# Patient Record
Sex: MALE | Race: WHITE | NOT HISPANIC OR LATINO | Employment: FULL TIME | URBAN - METROPOLITAN AREA
[De-identification: names, ages, dates, MRNs, and addresses within clinical notes are randomized per-mention and may not be internally consistent; named-entity substitution may affect disease eponyms.]

---

## 2023-11-01 ENCOUNTER — TELEPHONE (OUTPATIENT)
Dept: PRIMARY CARE | Facility: CLINIC | Age: 63
End: 2023-11-01
Payer: COMMERCIAL

## 2023-11-01 DIAGNOSIS — I10 BENIGN ESSENTIAL HYPERTENSION: Primary | ICD-10-CM

## 2023-11-01 RX ORDER — AMLODIPINE BESYLATE 5 MG/1
5 TABLET ORAL DAILY
Qty: 90 TABLET | Refills: 0 | Status: SHIPPED | OUTPATIENT
Start: 2023-11-01 | End: 2024-01-18 | Stop reason: SDUPTHER

## 2023-11-01 RX ORDER — AMLODIPINE BESYLATE 5 MG/1
5 TABLET ORAL DAILY
COMMUNITY
End: 2023-11-01 | Stop reason: SDUPTHER

## 2023-11-01 NOTE — TELEPHONE ENCOUNTER
Rx Refill Request Telephone Encounter    Name:  Yeison Case  :  [id number]  Medication Name:  amlodipine   Dose : 5 mg   Route :    Frequency : 1 a day   Quantity : 30  Directions :    Specific Pharmacy location:  [facility] [place]   Date of last appointment:  22  Date of next appointment:    Best number to reach patient:  [phone]

## 2023-12-11 ENCOUNTER — APPOINTMENT (OUTPATIENT)
Dept: PRIMARY CARE | Facility: CLINIC | Age: 63
End: 2023-12-11
Payer: COMMERCIAL

## 2024-01-04 ENCOUNTER — APPOINTMENT (OUTPATIENT)
Dept: PRIMARY CARE | Facility: CLINIC | Age: 64
End: 2024-01-04
Payer: COMMERCIAL

## 2024-01-18 ENCOUNTER — LAB (OUTPATIENT)
Dept: LAB | Facility: LAB | Age: 64
End: 2024-01-18
Payer: COMMERCIAL

## 2024-01-18 ENCOUNTER — OFFICE VISIT (OUTPATIENT)
Dept: PRIMARY CARE | Facility: CLINIC | Age: 64
End: 2024-01-18
Payer: COMMERCIAL

## 2024-01-18 VITALS
SYSTOLIC BLOOD PRESSURE: 128 MMHG | BODY MASS INDEX: 48.55 KG/M2 | OXYGEN SATURATION: 97 % | HEIGHT: 67 IN | TEMPERATURE: 97.2 F | DIASTOLIC BLOOD PRESSURE: 85 MMHG | HEART RATE: 79 BPM | WEIGHT: 309.3 LBS

## 2024-01-18 DIAGNOSIS — Z82.49 FAMILY HISTORY OF ABDOMINAL AORTIC ANEURYSM (AAA): ICD-10-CM

## 2024-01-18 DIAGNOSIS — Z12.5 PROSTATE CANCER SCREENING: ICD-10-CM

## 2024-01-18 DIAGNOSIS — Z87.891 EX-SMOKER: ICD-10-CM

## 2024-01-18 DIAGNOSIS — Z00.00 ANNUAL PHYSICAL EXAM: ICD-10-CM

## 2024-01-18 DIAGNOSIS — Z00.00 ANNUAL PHYSICAL EXAM: Primary | ICD-10-CM

## 2024-01-18 DIAGNOSIS — I10 BENIGN ESSENTIAL HYPERTENSION: ICD-10-CM

## 2024-01-18 PROBLEM — K64.9 HEMORRHOIDS: Status: ACTIVE | Noted: 2024-01-18

## 2024-01-18 PROBLEM — N13.30 BILATERAL HYDRONEPHROSIS: Status: ACTIVE | Noted: 2024-01-18

## 2024-01-18 PROBLEM — N43.3 HYDROCELE OF TESTIS: Status: ACTIVE | Noted: 2024-01-18

## 2024-01-18 PROBLEM — E55.9 VITAMIN D DEFICIENCY: Status: ACTIVE | Noted: 2024-01-18

## 2024-01-18 PROBLEM — J30.9 ALLERGIC RHINITIS: Status: ACTIVE | Noted: 2024-01-18

## 2024-01-18 PROBLEM — E66.01 MORBID OBESITY (MULTI): Status: ACTIVE | Noted: 2024-01-18

## 2024-01-18 PROBLEM — M10.9 GOUT: Status: ACTIVE | Noted: 2024-01-18

## 2024-01-18 PROBLEM — M48.02 CERVICAL STENOSIS OF SPINE: Status: ACTIVE | Noted: 2024-01-18

## 2024-01-18 PROBLEM — M47.812 CERVICAL SPONDYLOSIS: Status: ACTIVE | Noted: 2024-01-18

## 2024-01-18 PROBLEM — M19.90 ARTHRITIS: Status: ACTIVE | Noted: 2024-01-18

## 2024-01-18 LAB
ALBUMIN SERPL BCP-MCNC: 4 G/DL (ref 3.4–5)
ALP SERPL-CCNC: 62 U/L (ref 33–136)
ALT SERPL W P-5'-P-CCNC: 19 U/L (ref 10–52)
ANION GAP SERPL CALC-SCNC: 13 MMOL/L (ref 10–20)
AST SERPL W P-5'-P-CCNC: 14 U/L (ref 9–39)
BASOPHILS # BLD AUTO: 0.06 X10*3/UL (ref 0–0.1)
BASOPHILS NFR BLD AUTO: 0.8 %
BILIRUB SERPL-MCNC: 0.7 MG/DL (ref 0–1.2)
BUN SERPL-MCNC: 15 MG/DL (ref 6–23)
CALCIUM SERPL-MCNC: 8.9 MG/DL (ref 8.6–10.6)
CHLORIDE SERPL-SCNC: 107 MMOL/L (ref 98–107)
CHOLEST SERPL-MCNC: 176 MG/DL (ref 0–199)
CHOLESTEROL/HDL RATIO: 3.9
CO2 SERPL-SCNC: 26 MMOL/L (ref 21–32)
CREAT SERPL-MCNC: 0.95 MG/DL (ref 0.5–1.3)
EGFRCR SERPLBLD CKD-EPI 2021: >90 ML/MIN/1.73M*2
EOSINOPHIL # BLD AUTO: 0.13 X10*3/UL (ref 0–0.7)
EOSINOPHIL NFR BLD AUTO: 1.7 %
ERYTHROCYTE [DISTWIDTH] IN BLOOD BY AUTOMATED COUNT: 13 % (ref 11.5–14.5)
GLUCOSE SERPL-MCNC: 102 MG/DL (ref 74–99)
HCT VFR BLD AUTO: 46.8 % (ref 41–52)
HDLC SERPL-MCNC: 44.8 MG/DL
HGB BLD-MCNC: 15.1 G/DL (ref 13.5–17.5)
IMM GRANULOCYTES # BLD AUTO: 0.04 X10*3/UL (ref 0–0.7)
IMM GRANULOCYTES NFR BLD AUTO: 0.5 % (ref 0–0.9)
LDLC SERPL CALC-MCNC: 114 MG/DL
LYMPHOCYTES # BLD AUTO: 1.39 X10*3/UL (ref 1.2–4.8)
LYMPHOCYTES NFR BLD AUTO: 18 %
MCH RBC QN AUTO: 27.8 PG (ref 26–34)
MCHC RBC AUTO-ENTMCNC: 32.3 G/DL (ref 32–36)
MCV RBC AUTO: 86 FL (ref 80–100)
MONOCYTES # BLD AUTO: 0.54 X10*3/UL (ref 0.1–1)
MONOCYTES NFR BLD AUTO: 7 %
NEUTROPHILS # BLD AUTO: 5.58 X10*3/UL (ref 1.2–7.7)
NEUTROPHILS NFR BLD AUTO: 72 %
NON HDL CHOLESTEROL: 131 MG/DL (ref 0–149)
NRBC BLD-RTO: 0 /100 WBCS (ref 0–0)
PLATELET # BLD AUTO: 295 X10*3/UL (ref 150–450)
POTASSIUM SERPL-SCNC: 4.7 MMOL/L (ref 3.5–5.3)
PROT SERPL-MCNC: 6.6 G/DL (ref 6.4–8.2)
PSA SERPL-MCNC: 2.46 NG/ML
RBC # BLD AUTO: 5.43 X10*6/UL (ref 4.5–5.9)
SODIUM SERPL-SCNC: 141 MMOL/L (ref 136–145)
TRIGL SERPL-MCNC: 86 MG/DL (ref 0–149)
VLDL: 17 MG/DL (ref 0–40)
WBC # BLD AUTO: 7.7 X10*3/UL (ref 4.4–11.3)

## 2024-01-18 PROCEDURE — 1036F TOBACCO NON-USER: CPT | Performed by: INTERNAL MEDICINE

## 2024-01-18 PROCEDURE — 3079F DIAST BP 80-89 MM HG: CPT | Performed by: INTERNAL MEDICINE

## 2024-01-18 PROCEDURE — 85025 COMPLETE CBC W/AUTO DIFF WBC: CPT

## 2024-01-18 PROCEDURE — 80053 COMPREHEN METABOLIC PANEL: CPT

## 2024-01-18 PROCEDURE — 84153 ASSAY OF PSA TOTAL: CPT

## 2024-01-18 PROCEDURE — 36415 COLL VENOUS BLD VENIPUNCTURE: CPT

## 2024-01-18 PROCEDURE — 99396 PREV VISIT EST AGE 40-64: CPT | Performed by: INTERNAL MEDICINE

## 2024-01-18 PROCEDURE — 3074F SYST BP LT 130 MM HG: CPT | Performed by: INTERNAL MEDICINE

## 2024-01-18 PROCEDURE — 80061 LIPID PANEL: CPT

## 2024-01-18 RX ORDER — AMLODIPINE BESYLATE 5 MG/1
5 TABLET ORAL DAILY
Qty: 90 TABLET | Refills: 3 | Status: SHIPPED | OUTPATIENT
Start: 2024-01-18 | End: 2025-01-17

## 2024-01-18 ASSESSMENT — PATIENT HEALTH QUESTIONNAIRE - PHQ9
2. FEELING DOWN, DEPRESSED OR HOPELESS: NOT AT ALL
SUM OF ALL RESPONSES TO PHQ9 QUESTIONS 1 AND 2: 0
1. LITTLE INTEREST OR PLEASURE IN DOING THINGS: NOT AT ALL

## 2024-01-18 NOTE — PROGRESS NOTES
Subjective   Patient ID: Yeison Case is a 63 y.o. male who presents for Annual Exam (Pts right wrist has been hurting him. ).    HPI   The last health maintenance visit was 2 years  ago.   Has right wrist pain  The patient's health since the last visit is described as good.   PMH, PSH and current medications are noted below. Social and family history is noted below. he has regular dental visits. he complains of vision problems. Vision care includes wearing glasses and an eye examination more than a year ago. he denies hearing loss. Immunizations status: up to date.   Lifestyle: he consumes a diverse and healthy diet. he has weight concerns. he does not exercise regularly. he does not use tobacco. Tobacco Use Duration: 26 pack year(s) of cigarette use. he consumes alcohol. He reports occasional alcohol use.   Reproductive health: the patient is sexually active. He is monogamous with a female partner.   Colorectal Cancer Screening: rpt 2027.colonoscopy was performed 2017.   Prostate Cancer Screening:. patient is at high risk for prostate cancer.   Metabolic screening: lipid profile performed  and glucose screening performed .      Review of Systems  General: No significant change in weight, no fatigue, no fever, chills, or night sweats.  HEENT: No headache, dizziness, syncope. No blurred vision, double vision. No hearing loss, or ringing. No nasal discharge, sinus problems. No loose teeth, sore throat, hoarseness or neck stiffness.   Respiratory: No cough, mucous in throat, hemoptysis, wheezing, or shortness of breath. No snoring.  Cardiac: No chest pain, palpitations, orthopnea. No leg swelling or claudication pain.   Gastrointestinal: No indigestion, heartburn, nausea, vomiting, diarrhea, constipation, rectal bleeding or hemorrhoids.  Genitourinary: No UTI symptoms, stones, incontinence.No nocturia or hesitancy  Endocrine: No excess thirst or urination.  Hematopoietic: No anemia, easy bruising or bleeding. No  "history of blood transfusion.  Neuro: No localized weakness, numbness or tingling. No tremor, history of seizure. No history of memory problems.  Psychological: No anxiety, depression or sleep disturbance.   Objective   /85   Pulse 79   Temp 36.2 °C (97.2 °F)   Ht 1.702 m (5' 7\")   Wt 140 kg (309 lb 4.8 oz)   SpO2 97%   BMI 48.44 kg/m²     Physical Exam  Constitutional: General appearance: Abnormal.  obese.   Head and Face: Head and face: Normal.  Palpation of the face and sinuses: Normal.    Eyes: Normal external exam. Pupils were equal in size, round, reactive to light (PERRL) with normal accommodation and extraocular movements intact (EOMI).   Neck: No neck mass was observed. Supple. Thyroid not enlarged and there were no palpable thyroid nodules.   Cardiovascular: Heart rate and rhythm were normal, normal S1 and S2, no gallops, no murmurs and no pericardial rub. Pedal pulses: Normal. No peripheral edema.   Pulmonary: No respiratory distress. Clear bilateral breath sounds.   Chest: Breasts: Normal appearance, no nipple discharge. Chest: Normal.    Abdomen: Soft nontender; no abdominal mass palpated. No organomegaly. No hernias. Anus, perineum, and rectum: Abnormal.  ext hemorrhoid.   Genitourinary: sees uro.has hydrocele.  Musculoskeletal: Gait and station: Normal. No joint swelling seen, normal movements of all extremities. Range of motion: Normal.  Muscle strength/tone: Normal.  Has slight tenderness left right wrist extensor aspect.  Likely small ganglion cyst  Skin: Normal skin color and pigmentation, normal skin turgor, and no rash.   Neurologic: Cranial nerves 2-12 grossly intact. Coordination: Normal.    Psychiatric: Judgment and insight: Intact. Mood and affect: Normal.   Lymphatic: No cervical lymphadenopathy.   Assessment/Plan   Problem List Items Addressed This Visit             ICD-10-CM    Benign essential hypertension I10    Relevant Medications    amLODIPine (Norvasc) 5 mg tablet    " Other Relevant Orders    Vascular US abdominal aorta anuerysm AAA screening    Annual physical exam - Primary Z00.00    Relevant Orders    CBC and Auto Differential    Comprehensive Metabolic Panel    Lipid Panel     Other Visit Diagnoses         Codes    Prostate cancer screening     Z12.5    Relevant Orders    Prostate Specific Antigen    Family history of abdominal aortic aneurysm (AAA)     Z82.49    Relevant Orders    Vascular US abdominal aorta anuerysm AAA screening    Ex-smoker     Z87.891    Relevant Orders    Vascular US abdominal aorta anuerysm AAA screening             Preventive exam and counseling completed. Screening lab work ordered,. Risks and benefits of PSA discussed and he has high-risk family history  Weight reduction discussed. He is working on it.colonoscopynl 2017  See urology as needed for hydrocele.  Get covid vaccine.       Wrist pain likely from ganglion.  Wear wrist brace and also apply pressure.  See Ortho specialist if needed    pt has snoring,no apneas or daytime sleepiness  Will order CT scoring after labs

## 2024-01-22 ENCOUNTER — TELEPHONE (OUTPATIENT)
Dept: PRIMARY CARE | Facility: CLINIC | Age: 64
End: 2024-01-22
Payer: COMMERCIAL

## 2024-01-22 NOTE — TELEPHONE ENCOUNTER
----- Message from Malgorzata Contreras MD sent at 1/19/2024  4:18 PM EST -----  Advice all labs are normal.

## 2024-03-15 ENCOUNTER — LAB REQUISITION (OUTPATIENT)
Dept: LAB | Facility: HOSPITAL | Age: 64
End: 2024-03-15
Payer: COMMERCIAL

## 2024-03-15 DIAGNOSIS — R30.0 DYSURIA: ICD-10-CM

## 2024-03-15 DIAGNOSIS — N39.0 URINARY TRACT INFECTION, SITE NOT SPECIFIED: ICD-10-CM

## 2024-03-15 PROCEDURE — 87086 URINE CULTURE/COLONY COUNT: CPT

## 2024-03-15 PROCEDURE — 87186 SC STD MICRODIL/AGAR DIL: CPT

## 2024-03-18 LAB — BACTERIA UR CULT: ABNORMAL

## 2024-03-18 NOTE — RESULT ENCOUNTER NOTE
Got a urine culture result copied  to me.  Just making sure that you are on an antibiotic.  Please let me know  I would like to know the name of the antibiotic

## 2024-04-03 ENCOUNTER — OFFICE VISIT (OUTPATIENT)
Dept: PRIMARY CARE | Facility: CLINIC | Age: 64
End: 2024-04-03
Payer: COMMERCIAL

## 2024-04-03 VITALS
HEIGHT: 67 IN | HEART RATE: 77 BPM | OXYGEN SATURATION: 97 % | SYSTOLIC BLOOD PRESSURE: 129 MMHG | BODY MASS INDEX: 46.66 KG/M2 | DIASTOLIC BLOOD PRESSURE: 84 MMHG | TEMPERATURE: 96 F | WEIGHT: 297.3 LBS

## 2024-04-03 DIAGNOSIS — N30.01 ACUTE CYSTITIS WITH HEMATURIA: Primary | ICD-10-CM

## 2024-04-03 LAB
POC APPEARANCE, URINE: CLEAR
POC BILIRUBIN, URINE: NEGATIVE
POC BLOOD, URINE: ABNORMAL
POC COLOR, URINE: YELLOW
POC GLUCOSE, URINE: NEGATIVE MG/DL
POC KETONES, URINE: NEGATIVE MG/DL
POC LEUKOCYTES, URINE: ABNORMAL
POC NITRITE,URINE: NEGATIVE
POC PH, URINE: 6 PH
POC PROTEIN, URINE: ABNORMAL MG/DL
POC SPECIFIC GRAVITY, URINE: 1.01
POC UROBILINOGEN, URINE: 0.2 EU/DL

## 2024-04-03 PROCEDURE — 3079F DIAST BP 80-89 MM HG: CPT | Performed by: INTERNAL MEDICINE

## 2024-04-03 PROCEDURE — 1036F TOBACCO NON-USER: CPT | Performed by: INTERNAL MEDICINE

## 2024-04-03 PROCEDURE — 81002 URINALYSIS NONAUTO W/O SCOPE: CPT | Performed by: INTERNAL MEDICINE

## 2024-04-03 PROCEDURE — 3074F SYST BP LT 130 MM HG: CPT | Performed by: INTERNAL MEDICINE

## 2024-04-03 PROCEDURE — 87086 URINE CULTURE/COLONY COUNT: CPT

## 2024-04-03 PROCEDURE — 99213 OFFICE O/P EST LOW 20 MIN: CPT | Performed by: INTERNAL MEDICINE

## 2024-04-03 PROCEDURE — 87186 SC STD MICRODIL/AGAR DIL: CPT

## 2024-04-03 RX ORDER — CIPROFLOXACIN 500 MG/1
500 TABLET ORAL 2 TIMES DAILY
Qty: 14 TABLET | Refills: 0 | Status: SHIPPED | OUTPATIENT
Start: 2024-04-03 | End: 2024-04-10

## 2024-04-03 ASSESSMENT — PATIENT HEALTH QUESTIONNAIRE - PHQ9
SUM OF ALL RESPONSES TO PHQ9 QUESTIONS 1 AND 2: 0
1. LITTLE INTEREST OR PLEASURE IN DOING THINGS: NOT AT ALL
2. FEELING DOWN, DEPRESSED OR HOPELESS: NOT AT ALL

## 2024-04-06 LAB — BACTERIA UR CULT: ABNORMAL

## 2024-04-09 ENCOUNTER — TELEPHONE (OUTPATIENT)
Dept: PRIMARY CARE | Facility: CLINIC | Age: 64
End: 2024-04-09
Payer: COMMERCIAL

## 2024-04-09 NOTE — TELEPHONE ENCOUNTER
----- Message from Malgorzata Contreras MD sent at 4/8/2024  8:44 AM EDT -----  Advice to complete cipro course based on culture results.

## 2024-04-25 ENCOUNTER — OFFICE VISIT (OUTPATIENT)
Dept: UROLOGY | Facility: HOSPITAL | Age: 64
End: 2024-04-25
Payer: COMMERCIAL

## 2024-04-25 DIAGNOSIS — N30.00 ACUTE CYSTITIS WITHOUT HEMATURIA: Primary | ICD-10-CM

## 2024-04-25 DIAGNOSIS — N40.1 BENIGN LOCALIZED PROSTATIC HYPERPLASIA WITH LOWER URINARY TRACT SYMPTOMS (LUTS): ICD-10-CM

## 2024-04-25 PROCEDURE — 99214 OFFICE O/P EST MOD 30 MIN: CPT | Performed by: UROLOGY

## 2024-04-25 PROCEDURE — 51798 US URINE CAPACITY MEASURE: CPT | Performed by: UROLOGY

## 2024-04-25 PROCEDURE — 1036F TOBACCO NON-USER: CPT | Performed by: UROLOGY

## 2024-04-25 ASSESSMENT — PAIN SCALES - GENERAL: PAINLEVEL: 0-NO PAIN

## 2024-04-25 NOTE — PROGRESS NOTES
FUV    Last seen - 10/15/21     HISTORY OF PRESENT ILLNESS:   Yeison Case is a 64 y.o. male who is being seen today for fuv.  Has not been seen since 2021. Treated for hydrocele in the past, had left hydrocelectomy.      Recently presented to urgent care with UTI. Pan-sensitive UTI, first treated in march, had another UTI few weeks later.      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    Cervical disc displacement    Other chest pain 03/03/2021    Atypical chest pain    Other specified cough 02/01/2021    Post-viral cough syndrome    Personal history of other diseases of the musculoskeletal system and connective tissue 08/07/2014    History of neck pain    Personal history of other specified conditions 05/06/2020    History of epistaxis       PAST SURGICAL HISTORY:  Past Surgical History:   Procedure Laterality Date    OTHER SURGICAL HISTORY  10/24/2017    Hemorrhoidectomy Doppler-Guided Artery Ligation    RHINOPLASTY  08/07/2014    Rhinoplasty        ALLERGIES:   Allergies   Allergen Reactions    Dog Dander Other    House Dust Other    Sulfa (Sulfonamide Antibiotics) Rash and Other        MEDICATIONS:   Current Outpatient Medications   Medication Instructions    amLODIPine (NORVASC) 5 mg, oral, Daily, as directed        PHYSICAL EXAM:  There were no vitals taken for this visit.  Constitutional: Patient appears well-developed and well-nourished. No distress.    Pulmonary/Chest: Effort normal. No respiratory distress.   Musculoskeletal: Normal range of motion.    Neurological: Alert and oriented to person, place, and time.  Psychiatric: Normal mood and affect. Behavior is normal.  Thought content normal.      Labs  Lab Results   Component Value Date    PSA 2.46 01/18/2024     Lab Results   Component Value Date    GFRMALE >90 06/11/2022     Lab Results   Component Value Date    CREATININE 0.95 01/18/2024     Lab Results   Component Value Date    CHOL 176 01/18/2024     Lab Results   Component Value Date    HDL 44.8 01/18/2024     Lab Results   Component Value Date    CHHDL 3.9 01/18/2024     Lab Results   Component Value Date    LDLF 111 (H) 06/11/2022     Lab Results   Component Value Date    VLDL 17 01/18/2024     Lab Results   Component Value Date    TRIG 86 01/18/2024     Lab Results   Component Value Date    HGBA1C 5.1 06/11/2022     Lab Results   Component Value Date    HCT 46.8 01/18/2024       PVR 3      Assessment:      1. Acute cystitis without hematuria        2. Benign localized prostatic hyperplasia with lower urinary tract symptoms (LUTS)          Yeison Case is a 64 y.o. male here for FUV     Plan:   1)  Will get ct abd/pel to assess for nidus of infection  2) Conservative voiding measures    Lower Urinary Tract Symptoms (LUTS)    I educated the patient on relevant lower urinary tract anatomy and physiology with emphasis on differential diagnosis as well as contributing factors to the patient's lower urinary tract symptoms. I educated the patient on dietary and behavioral modifications pertinent to the patient's complaints. I recommended to avoid caffeine, alcohol, spicy and acidic oral intake and to regulate fluid intake and voiding (timed voiding, double voiding). I stressed the importance of avoiding constipation and recommended stool softeners unless diarrhea present. We discussed limiting fluid intake at night and elevating legs prior to bedtime.     The mechanism of action as well as the risks, benefits, common side effects, and alternatives to all prescribed medications were discussed with the patient at length. The patient had the opportunity to ask questions and all  questions were answered. I primarily discussed alpha blockers, 5ARIs, PDE5i, anticholinergics, and beta 3 agonist (mirabegron).  I explained that alpha blockers help decrease bladder outlet resistance with potential side effects to include retrograde ejaculation, rhinitis, and light headedness. I explained that 5 alpha reductase inhibitors can shrink the prostate up to 30%, can artificially decrease their PSA value by 50%, but take approximately 6-9 months to reach full efficacy and have potential side effects to include decreased libido, impotence and breast tenderness.

## 2024-05-02 ENCOUNTER — HOSPITAL ENCOUNTER (OUTPATIENT)
Dept: RADIOLOGY | Facility: CLINIC | Age: 64
Discharge: HOME | End: 2024-05-02
Payer: COMMERCIAL

## 2024-05-02 DIAGNOSIS — N30.00 ACUTE CYSTITIS WITHOUT HEMATURIA: ICD-10-CM

## 2024-05-02 PROCEDURE — 74176 CT ABD & PELVIS W/O CONTRAST: CPT

## 2024-05-02 PROCEDURE — 74176 CT ABD & PELVIS W/O CONTRAST: CPT | Performed by: STUDENT IN AN ORGANIZED HEALTH CARE EDUCATION/TRAINING PROGRAM

## 2024-05-09 ENCOUNTER — OFFICE VISIT (OUTPATIENT)
Dept: UROLOGY | Facility: HOSPITAL | Age: 64
End: 2024-05-09
Payer: COMMERCIAL

## 2024-05-09 DIAGNOSIS — N40.1 BENIGN LOCALIZED PROSTATIC HYPERPLASIA WITH LOWER URINARY TRACT SYMPTOMS (LUTS): Primary | ICD-10-CM

## 2024-05-09 PROCEDURE — 99214 OFFICE O/P EST MOD 30 MIN: CPT | Performed by: UROLOGY

## 2024-05-09 NOTE — PROGRESS NOTES
FUV    Last seen - 4/25/24     HISTORY OF PRESENT ILLNESS:   Yeison Case is a 64 y.o. male who is being seen today for fuv.  Has not been seen since 2021. Treated for hydrocele in the past, had left hydrocelectomy.      Recently presented to urgent care with UTI. Pan-sensitive UTI, first treated in march, had another UTI few weeks later.      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    Cervical disc displacement    Other chest pain 03/03/2021    Atypical chest pain    Other specified cough 02/01/2021    Post-viral cough syndrome    Personal history of other diseases of the musculoskeletal system and connective tissue 08/07/2014    History of neck pain    Personal history of other specified conditions 05/06/2020    History of epistaxis     PAST SURGICAL HISTORY:  Past Surgical History:   Procedure Laterality Date    OTHER SURGICAL HISTORY  10/24/2017    Hemorrhoidectomy Doppler-Guided Artery Ligation    RHINOPLASTY  08/07/2014    Rhinoplasty     ALLERGIES:   Allergies   Allergen Reactions    Dog Dander Other    House Dust Other    Sulfa (Sulfonamide Antibiotics) Rash and Other     MEDICATIONS:   Current Outpatient Medications   Medication Instructions    amLODIPine (NORVASC) 5 mg, oral, Daily, as directed      PHYSICAL EXAM:  There were no vitals taken for this visit.  Constitutional: Patient appears well-developed and well-nourished. No distress.    Pulmonary/Chest: Effort normal. No respiratory distress.   Musculoskeletal: Normal range of motion.    Neurological: Alert and oriented to person, place, and  time.  Psychiatric: Normal mood and affect. Behavior is normal. Thought content normal.      Labs  Lab Results   Component Value Date    PSA 2.46 01/18/2024     Lab Results   Component Value Date    GFRMALE >90 06/11/2022     Lab Results   Component Value Date    CREATININE 0.95 01/18/2024     Lab Results   Component Value Date    CHOL 176 01/18/2024     Lab Results   Component Value Date    HDL 44.8 01/18/2024     Lab Results   Component Value Date    CHHDL 3.9 01/18/2024     Lab Results   Component Value Date    LDLF 111 (H) 06/11/2022     Lab Results   Component Value Date    VLDL 17 01/18/2024     Lab Results   Component Value Date    TRIG 86 01/18/2024     Lab Results   Component Value Date    HGBA1C 5.1 06/11/2022     Lab Results   Component Value Date    HCT 46.8 01/18/2024         Assessment:      1. Benign localized prostatic hyperplasia with lower urinary tract symptoms (LUTS)          Yeison Case is a 64 y.o. male here for FUV    Doing well  CT negative for nidus  Plan:     Lower Urinary Tract Symptoms (LUTS)    I educated the patient on relevant lower urinary tract anatomy and physiology with emphasis on differential diagnosis as well as contributing factors to the patient's lower urinary tract symptoms. I educated the patient on dietary and behavioral modifications pertinent to the patient's complaints. I recommended to avoid caffeine, alcohol, spicy and acidic oral intake and to regulate fluid intake and voiding (timed voiding, double voiding). I stressed the importance of avoiding constipation and recommended stool softeners unless diarrhea present. We discussed limiting fluid intake at night and elevating legs prior to bedtime.     The mechanism of action as well as the risks, benefits, common side effects, and alternatives to all prescribed medications were discussed with the patient at length. The patient had the opportunity to ask questions and all questions were answered. I primarily discussed  alpha blockers, 5ARIs, PDE5i, anticholinergics, and beta 3 agonist (mirabegron).  I explained that alpha blockers help decrease bladder outlet resistance with potential side effects to include retrograde ejaculation, rhinitis, and light headedness. I explained that 5 alpha reductase inhibitors can shrink the prostate up to 30%, can artificially decrease their PSA value by 50%, but take approximately 6-9 months to reach full efficacy and have potential side effects to include decreased libido, impotence and breast tenderness.

## 2024-07-22 ENCOUNTER — OFFICE VISIT (OUTPATIENT)
Dept: UROLOGY | Facility: HOSPITAL | Age: 64
End: 2024-07-22
Payer: COMMERCIAL

## 2024-07-22 DIAGNOSIS — N50.812 PAIN IN BOTH TESTICLES: ICD-10-CM

## 2024-07-22 DIAGNOSIS — N30.00 ACUTE CYSTITIS WITHOUT HEMATURIA: ICD-10-CM

## 2024-07-22 DIAGNOSIS — N40.1 BENIGN LOCALIZED PROSTATIC HYPERPLASIA WITH LOWER URINARY TRACT SYMPTOMS (LUTS): Primary | ICD-10-CM

## 2024-07-22 DIAGNOSIS — N50.811 PAIN IN BOTH TESTICLES: ICD-10-CM

## 2024-07-22 DIAGNOSIS — N43.3 HYDROCELE OF TESTIS: ICD-10-CM

## 2024-07-22 LAB
POC APPEARANCE, URINE: CLEAR
POC BILIRUBIN, URINE: NEGATIVE
POC BLOOD, URINE: ABNORMAL
POC COLOR, URINE: YELLOW
POC GLUCOSE, URINE: NEGATIVE MG/DL
POC KETONES, URINE: NEGATIVE MG/DL
POC LEUKOCYTES, URINE: NEGATIVE
POC NITRITE,URINE: NEGATIVE
POC PH, URINE: 6.5 PH
POC PROTEIN, URINE: NEGATIVE MG/DL
POC SPECIFIC GRAVITY, URINE: 1.01
POC UROBILINOGEN, URINE: 0.2 EU/DL

## 2024-07-22 PROCEDURE — 51798 US URINE CAPACITY MEASURE: CPT | Performed by: UROLOGY

## 2024-07-22 PROCEDURE — 99214 OFFICE O/P EST MOD 30 MIN: CPT | Mod: 25 | Performed by: UROLOGY

## 2024-07-22 PROCEDURE — 99214 OFFICE O/P EST MOD 30 MIN: CPT | Performed by: UROLOGY

## 2024-07-22 PROCEDURE — 81003 URINALYSIS AUTO W/O SCOPE: CPT | Performed by: UROLOGY

## 2024-07-22 NOTE — PROGRESS NOTES
FUV    Last seen - 5/9/24     HISTORY OF PRESENT ILLNESS:   Yeison Case is a 64 y.o. male who is being seen today for fuv.  Has not been seen since 2021. Treated for hydrocele in the past, had left hydrocelectomy.      Recently presented to urgent care with UTI. Pan-sensitive UTI, first treated in march, had another UTI few weeks later.      5/9/24 - Been urinating ok, no dysuria, no urgency or frequency    CT abd/pel - no stones, no hydro, left simple cyst, small prostate    7/22/24 - Hydrocele becoming more bothersome. 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    Cervical disc displacement    Other chest pain 03/03/2021    Atypical chest pain    Other specified cough 02/01/2021    Post-viral cough syndrome    Personal history of other diseases of the musculoskeletal system and connective tissue 08/07/2014    History of neck pain    Personal history of other specified conditions 05/06/2020    History of epistaxis     PAST SURGICAL HISTORY:  Past Surgical History:   Procedure Laterality Date    OTHER SURGICAL HISTORY  10/24/2017    Hemorrhoidectomy Doppler-Guided Artery Ligation    RHINOPLASTY  08/07/2014    Rhinoplasty     ALLERGIES:   Allergies   Allergen Reactions    Dog Dander Other    House Dust Other    Sulfa (Sulfonamide Antibiotics) Rash and Other     MEDICATIONS:   Current Outpatient Medications   Medication Instructions    amLODIPine (NORVASC) 5 mg, oral, Daily, as directed      PHYSICAL EXAM:  There were no vitals taken for this visit.  Constitutional: Patient appears well-developed and well-nourished. No distress.    Pulmonary/Chest: Effort normal. No respiratory distress.   Musculoskeletal: Normal range of  motion.    Neurological: Alert and oriented to person, place, and time.  Psychiatric: Normal mood and affect. Behavior is normal. Thought content normal.      Labs  Lab Results   Component Value Date    PSA 2.46 01/18/2024     Lab Results   Component Value Date    GFRMALE >90 06/11/2022     Lab Results   Component Value Date    CREATININE 0.95 01/18/2024     Lab Results   Component Value Date    CHOL 176 01/18/2024     Lab Results   Component Value Date    HDL 44.8 01/18/2024     Lab Results   Component Value Date    CHHDL 3.9 01/18/2024     Lab Results   Component Value Date    LDLF 111 (H) 06/11/2022     Lab Results   Component Value Date    VLDL 17 01/18/2024     Lab Results   Component Value Date    TRIG 86 01/18/2024     Lab Results   Component Value Date    HGBA1C 5.1 06/11/2022     Lab Results   Component Value Date    HCT 46.8 01/18/2024     CT abd/pel (5/2/24)   ACCESSION NUMBER(S):  [id number]      ORDERING CLINICIAN:  SUNIL GRAJEDA      TECHNIQUE:  CT of the abdomen and pelvis was performed. Contiguous axial images  were obtained at 3 mm slice thickness through the abdomen and pelvis.  Coronal and sagittal reconstructions at 3 mm slice thickness were  performed.  No intravenous contrast was administered.      FINDINGS:  Please note that the evaluation of vessels, lymph nodes and organs is  limited without intravenous contrast.      LOWER CHEST:  Patchy and linear opacities in the lingula and left lower lobe,  compatible with atelectasis.      ABDOMEN:      LIVER:  Hepatic steatosis multiple low-attenuation lesions, compatible with  simple cysts.      BILE DUCTS:  The intrahepatic and extrahepatic ducts are not dilated.      GALLBLADDER:  The gallbladder contains multiple radiopaque stones.      PANCREAS:  The pancreas appears unremarkable without evidence of ductal  dilatation or masses.      SPLEEN:  The spleen is normal in size without focal lesions.      ADRENAL GLANDS:  Bilateral adrenal glands appear  normal.      KIDNEYS AND URETERS:  The kidneys are normal in size and unremarkable in appearance.  Moderate left pelviectasis. 3.3 cm left upper pole simple cyst and  subcentimeter right interpolar region simple cysts.. No renal calculi  or hydronephrosis.      PELVIS:      BLADDER:  The urinary bladder appears normal without abnormal wall thickening.      REPRODUCTIVE ORGANS:  No pelvic masses.      BOWEL:  The stomach is unremarkable.  The small and large bowel are normal in  caliber and demonstrate no wall thickening.  The appendix appears  normal.      VESSELS:  Mild atherosclerotic changes are noted to the aorta and branching  vessels. There is no aneurysmal dilatation.      PERITONEUM/RETROPERITONEUM/LYMPH NODES:  No ascites or free air, no fluid collection.  No abdominopelvic  lymphadenopathy is present.      ABDOMINAL WALL:  The abdominal wall soft tissues appear normal.      BONES:  No suspicious osseous lesions are identified. Degenerative discogenic  disease is noted in the lower thoracic and lumbar spine.      IMPRESSION:  No acute findings in the abdomen and pelvis.  Moderate left pelviectasis. No obstructing calculus.    PVR 7cc    Assessment:      1. Benign localized prostatic hyperplasia with lower urinary tract symptoms (LUTS)  POCT UA Automated manually resulted    Measure post void residual      2. Acute cystitis without hematuria        3. Hydrocele of testis          Yeison Case is a 64 y.o. male here for FUV    Plan:   Will repeat scrotal US  Urinating better, cont conservative measures

## 2024-08-06 ENCOUNTER — HOSPITAL ENCOUNTER (OUTPATIENT)
Dept: RADIOLOGY | Facility: HOSPITAL | Age: 64
Discharge: HOME | End: 2024-08-06
Payer: COMMERCIAL

## 2024-08-06 DIAGNOSIS — N50.811 PAIN IN BOTH TESTICLES: ICD-10-CM

## 2024-08-06 DIAGNOSIS — N50.812 PAIN IN BOTH TESTICLES: ICD-10-CM

## 2024-08-06 PROCEDURE — 76870 US EXAM SCROTUM: CPT | Performed by: RADIOLOGY

## 2024-08-06 PROCEDURE — 93975 VASCULAR STUDY: CPT

## 2024-11-07 ENCOUNTER — OFFICE VISIT (OUTPATIENT)
Dept: UROLOGY | Facility: HOSPITAL | Age: 64
End: 2024-11-07
Payer: COMMERCIAL

## 2024-11-07 DIAGNOSIS — N43.3 HYDROCELE OF TESTIS: Primary | ICD-10-CM

## 2024-11-07 PROCEDURE — 99213 OFFICE O/P EST LOW 20 MIN: CPT | Performed by: UROLOGY

## 2024-11-07 NOTE — PROGRESS NOTES
FUV    Last seen - 7/22/24     HISTORY OF PRESENT ILLNESS:   Yeison Case is a 64 y.o. male who is being seen today for fuv.      Treated for hydrocele in the past, had left hydrocelectomy.      5/9/24 - Been urinating ok, no dysuria, no urgency or frequency    CT abd/pel - no stones, no hydro, left simple cyst, small prostate    7/22/24 - Hydrocele becoming more bothersome. Urinating ok    Scrotal US (8/6/24) - L>R hydrocele, epididymal cysts    11/7/24 - Doing well.  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    Cervical disc displacement    Other chest pain 03/03/2021    Atypical chest pain    Other specified cough 02/01/2021    Post-viral cough syndrome    Personal history of other diseases of the musculoskeletal system and connective tissue 08/07/2014    History of neck pain    Personal history of other specified conditions 05/06/2020    History of epistaxis     PAST SURGICAL HISTORY:  Past Surgical History:   Procedure Laterality Date    OTHER SURGICAL HISTORY  10/24/2017    Hemorrhoidectomy Doppler-Guided Artery Ligation    RHINOPLASTY  08/07/2014    Rhinoplasty     ALLERGIES:   Allergies   Allergen Reactions    Dog Dander Other    House Dust Other    Sulfa (Sulfonamide Antibiotics) Rash and Other     MEDICATIONS:   Current Outpatient Medications   Medication Instructions    amLODIPine (NORVASC) 5 mg, oral, Daily, as directed      PHYSICAL EXAM:  There were no vitals taken for this visit.  Constitutional: Patient appears well-developed and well-nourished. No distress.    Pulmonary/Chest: Effort normal. No respiratory distress.   Musculoskeletal: Normal range of motion.     Neurological: Alert and oriented to person, place, and time.  Psychiatric: Normal mood and affect. Behavior is normal. Thought content normal.      Labs  Lab Results   Component Value Date    PSA 2.46 01/18/2024     Lab Results   Component Value Date    GFRMALE >90 06/11/2022     Lab Results   Component Value Date    CREATININE 0.95 01/18/2024     Lab Results   Component Value Date    CHOL 176 01/18/2024     Lab Results   Component Value Date    HDL 44.8 01/18/2024     Lab Results   Component Value Date    CHHDL 3.9 01/18/2024     Lab Results   Component Value Date    LDLF 111 (H) 06/11/2022     Lab Results   Component Value Date    VLDL 17 01/18/2024     Lab Results   Component Value Date    TRIG 86 01/18/2024     Lab Results   Component Value Date    HGBA1C 5.1 06/11/2022     Lab Results   Component Value Date    HCT 46.8 01/18/2024     Assessment:      1. Hydrocele of testis          Yeison Case is a 64 y.o. male here for FUV    Plan:   Doing ok, will monitor symptoms, fu if worsening and wants to proceed with hydrocelectomy

## 2025-02-14 DIAGNOSIS — I10 BENIGN ESSENTIAL HYPERTENSION: ICD-10-CM

## 2025-02-17 DIAGNOSIS — I10 BENIGN ESSENTIAL HYPERTENSION: ICD-10-CM

## 2025-02-17 RX ORDER — AMLODIPINE BESYLATE 5 MG/1
5 TABLET ORAL DAILY
Qty: 90 TABLET | Refills: 0 | Status: SHIPPED | OUTPATIENT
Start: 2025-02-17 | End: 2025-05-18

## 2025-02-17 RX ORDER — AMLODIPINE BESYLATE 5 MG/1
5 TABLET ORAL DAILY
Qty: 90 TABLET | Refills: 0 | OUTPATIENT
Start: 2025-02-17

## 2025-02-26 ENCOUNTER — APPOINTMENT (OUTPATIENT)
Dept: PRIMARY CARE | Facility: CLINIC | Age: 65
End: 2025-02-26
Payer: COMMERCIAL

## 2025-02-26 VITALS
BODY MASS INDEX: 48.56 KG/M2 | SYSTOLIC BLOOD PRESSURE: 136 MMHG | HEART RATE: 88 BPM | HEIGHT: 67 IN | WEIGHT: 309.4 LBS | OXYGEN SATURATION: 96 % | DIASTOLIC BLOOD PRESSURE: 90 MMHG

## 2025-02-26 DIAGNOSIS — I10 BENIGN ESSENTIAL HYPERTENSION: ICD-10-CM

## 2025-02-26 DIAGNOSIS — Z00.00 ANNUAL PHYSICAL EXAM: Primary | ICD-10-CM

## 2025-02-26 DIAGNOSIS — J40 BRONCHITIS: ICD-10-CM

## 2025-02-26 PROCEDURE — 3080F DIAST BP >= 90 MM HG: CPT | Performed by: INTERNAL MEDICINE

## 2025-02-26 PROCEDURE — 3075F SYST BP GE 130 - 139MM HG: CPT | Performed by: INTERNAL MEDICINE

## 2025-02-26 PROCEDURE — 3008F BODY MASS INDEX DOCD: CPT | Performed by: INTERNAL MEDICINE

## 2025-02-26 PROCEDURE — 99396 PREV VISIT EST AGE 40-64: CPT | Performed by: INTERNAL MEDICINE

## 2025-02-26 PROCEDURE — 1036F TOBACCO NON-USER: CPT | Performed by: INTERNAL MEDICINE

## 2025-02-26 RX ORDER — SPIRONOLACTONE 50 MG/1
50 TABLET, FILM COATED ORAL DAILY
Qty: 90 TABLET | Refills: 0 | Status: SHIPPED | OUTPATIENT
Start: 2025-02-26 | End: 2025-05-27

## 2025-02-26 RX ORDER — BENZONATATE 200 MG/1
200 CAPSULE ORAL 3 TIMES DAILY PRN
Qty: 21 CAPSULE | Refills: 0 | Status: SHIPPED | OUTPATIENT
Start: 2025-02-26 | End: 2025-03-05

## 2025-02-26 RX ORDER — SPIRONOLACTONE 50 MG/1
50 TABLET, FILM COATED ORAL DAILY
Qty: 30 TABLET | Refills: 11 | Status: SHIPPED | OUTPATIENT
Start: 2025-02-26 | End: 2025-02-26 | Stop reason: SDUPTHER

## 2025-02-26 ASSESSMENT — PATIENT HEALTH QUESTIONNAIRE - PHQ9
1. LITTLE INTEREST OR PLEASURE IN DOING THINGS: NOT AT ALL
2. FEELING DOWN, DEPRESSED OR HOPELESS: NOT AT ALL
SUM OF ALL RESPONSES TO PHQ9 QUESTIONS 1 AND 2: 0

## 2025-02-26 NOTE — PROGRESS NOTES
"Subjective   Patient ID: Dung Case is a 65 y.o. male who presents for Annual Exam (Pt started having symptoms of dry/scratchy throat and chest congestion with cough on Friday. Also has a bit of a nasal drainage. Having some trouble staying asleep due to some pain as well as sinus pain. Had covid a month ago. Pt is negative for covid. ).    HPI   For annual exam  Has ongoing cough and chest congestion after upper respiratory infection  Taking NyQuil and DayQuil  No chest pain or wheezing  Had eye exam  Under under stress and BP could be higher from that  Taking medications  Colonoscopy normal 2017  Vaccines: Up-to-date  Review of Systems  General: No significant change in weight, no fatigue, no fever, chills, or night sweats.has uri  HEENT: No headache, dizziness, syncope. No blurred vision, double vision. No hearing loss, or ringing. Has nasal discharge, has sinus pain.. No loose teeth, has mild sore throat, hoarseness or neck stiffness.   Respiratory: has  cough, mucous in throat, hemoptysis, wheezing, or shortness of breath. No snoring.  Cardiac: No chest pain, palpitations, orthopnea. No leg swelling or claudication pain.   Gastrointestinal: No indigestion, heartburn, nausea, vomiting, diarrhea, constipation, rectal bleeding or hemorrhoids.  Genitourinary: No UTI symptoms, stones, incontinence.No nocturia or hesitancy  Endocrine: No excess thirst or urination.  Hematopoietic: No anemia, easy bruising or bleeding. No history of blood transfusion.  Neuro: No localized weakness, numbness or tingling. No tremor, history of seizure. No history of memory problems.  Psychological: No anxiety, depression or sleep disturbance.   Objective   /90   Pulse 88   Ht 1.702 m (5' 7\")   Wt 140 kg (309 lb 6.4 oz)   SpO2 96%   BMI 48.46 kg/m²     Physical Exam  Constitutional: General appearance: Abnormal.  obese.   Head and Face: Head and face: Normal.  Palpation of the face and sinuses: Normal.    Eyes: Normal external " exam. Pupils were equal in size, round, reactive to light (PERRL) with normal accommodation and extraocular movements intact (EOMI).   Neck: No neck mass was observed. Supple. Thyroid not enlarged and there were no palpable thyroid nodules.   Cardiovascular: Heart rate and rhythm were normal, normal S1 and S2, no gallops, no murmurs and no pericardial rub. Pedal pulses: Normal. one plus peripheral edema.   Pulmonary: No respiratory distress. Clear bilateral breath sounds.   Chest: Breasts: Normal appearance,. Chest: Normal.    Abdomen: Soft nontender; no abdominal mass palpated. No organomegaly.  Genitourinary: sees uro.has hydrocele.  Musculoskeletal: Gait and station: Normal. No joint swelling seen, normal movements of all extremities. Range of motion: Normal.  Muscle strength/tone: Normal.  Skin:normal skin turgor, has chronic dermatitis lower extremities.  Has erythematous scaly patches on upper extremities face  Neurologic: Cranial nerves 2-12 grossly intact. Coordination: Normal.    Psychiatric: Judgment and insight: Intact. Mood and affect: Normal.   Lymphatic: No cervical lymphadenopathy.   Assessment/Plan   Problem List Items Addressed This Visit             ICD-10-CM    Benign essential hypertension I10    Relevant Medications    spironolactone (Aldactone) 50 mg tablet    Annual physical exam - Primary Z00.00    Relevant Orders    Comprehensive Metabolic Panel    Lipid Panel    CBC and Auto Differential    Prostate Specific Antigen    Bronchitis J40    Relevant Medications    benzonatate (Tessalon) 200 mg capsule     Preventive  exam and counseling completed  Routine labs and PSA screen ordered  Up-to-date with colonoscopy  See urology as scheduled  Can use shampoo with ketoconazole like Nizoral on skin.  See dermatologist if needed  TLC discussed for weight loss again  BP high  Will add low-dose spironolactone  Check labs in a couple weeks  He has snoring but no other symptoms of sleep apnea  Tessalon  Arian prescribed.  Continue supportive care  Call me back if cough is worsening  Follow-up in 3 months

## 2025-04-10 LAB
ALBUMIN SERPL-MCNC: 3.8 G/DL (ref 3.6–5.1)
ALP SERPL-CCNC: 62 U/L (ref 35–144)
ALT SERPL-CCNC: 17 U/L (ref 9–46)
ANION GAP SERPL CALCULATED.4IONS-SCNC: 9 MMOL/L (CALC) (ref 7–17)
AST SERPL-CCNC: 15 U/L (ref 10–35)
BASOPHILS # BLD AUTO: 51 CELLS/UL (ref 0–200)
BASOPHILS NFR BLD AUTO: 0.8 %
BILIRUB SERPL-MCNC: 0.9 MG/DL (ref 0.2–1.2)
BUN SERPL-MCNC: 14 MG/DL (ref 7–25)
CALCIUM SERPL-MCNC: 8.8 MG/DL (ref 8.6–10.3)
CHLORIDE SERPL-SCNC: 105 MMOL/L (ref 98–110)
CHOLEST SERPL-MCNC: 169 MG/DL
CHOLEST/HDLC SERPL: 3.5 (CALC)
CO2 SERPL-SCNC: 26 MMOL/L (ref 20–32)
CREAT SERPL-MCNC: 1.07 MG/DL (ref 0.7–1.35)
EGFRCR SERPLBLD CKD-EPI 2021: 78 ML/MIN/1.73M2
EOSINOPHIL # BLD AUTO: 141 CELLS/UL (ref 15–500)
EOSINOPHIL NFR BLD AUTO: 2.2 %
ERYTHROCYTE [DISTWIDTH] IN BLOOD BY AUTOMATED COUNT: 13.1 % (ref 11–15)
GLUCOSE SERPL-MCNC: 104 MG/DL (ref 65–99)
HCT VFR BLD AUTO: 44.6 % (ref 38.5–50)
HDLC SERPL-MCNC: 48 MG/DL
HGB BLD-MCNC: 14.8 G/DL (ref 13.2–17.1)
LDLC SERPL CALC-MCNC: 102 MG/DL (CALC)
LYMPHOCYTES # BLD AUTO: 1229 CELLS/UL (ref 850–3900)
LYMPHOCYTES NFR BLD AUTO: 19.2 %
MCH RBC QN AUTO: 28.8 PG (ref 27–33)
MCHC RBC AUTO-ENTMCNC: 33.2 G/DL (ref 32–36)
MCV RBC AUTO: 86.8 FL (ref 80–100)
MONOCYTES # BLD AUTO: 550 CELLS/UL (ref 200–950)
MONOCYTES NFR BLD AUTO: 8.6 %
NEUTROPHILS # BLD AUTO: 4429 CELLS/UL (ref 1500–7800)
NEUTROPHILS NFR BLD AUTO: 69.2 %
NONHDLC SERPL-MCNC: 121 MG/DL (CALC)
PLATELET # BLD AUTO: 299 THOUSAND/UL (ref 140–400)
PMV BLD REES-ECKER: 9.5 FL (ref 7.5–12.5)
POTASSIUM SERPL-SCNC: 5.5 MMOL/L (ref 3.5–5.3)
PROT SERPL-MCNC: 6.1 G/DL (ref 6.1–8.1)
PSA SERPL-MCNC: 2.86 NG/ML
RBC # BLD AUTO: 5.14 MILLION/UL (ref 4.2–5.8)
SODIUM SERPL-SCNC: 140 MMOL/L (ref 135–146)
TRIGL SERPL-MCNC: 98 MG/DL
WBC # BLD AUTO: 6.4 THOUSAND/UL (ref 3.8–10.8)

## 2025-04-23 ENCOUNTER — OFFICE VISIT (OUTPATIENT)
Dept: URGENT CARE | Age: 65
End: 2025-04-23
Payer: COMMERCIAL

## 2025-04-23 VITALS
RESPIRATION RATE: 15 BRPM | DIASTOLIC BLOOD PRESSURE: 79 MMHG | SYSTOLIC BLOOD PRESSURE: 129 MMHG | OXYGEN SATURATION: 98 % | HEART RATE: 92 BPM | TEMPERATURE: 98 F

## 2025-04-23 DIAGNOSIS — L24.89 IRRITANT CONTACT DERMATITIS DUE TO OTHER AGENTS: Primary | ICD-10-CM

## 2025-04-23 PROCEDURE — 99213 OFFICE O/P EST LOW 20 MIN: CPT | Performed by: NURSE PRACTITIONER

## 2025-04-23 PROCEDURE — 3074F SYST BP LT 130 MM HG: CPT | Performed by: NURSE PRACTITIONER

## 2025-04-23 PROCEDURE — 1036F TOBACCO NON-USER: CPT | Performed by: NURSE PRACTITIONER

## 2025-04-23 PROCEDURE — 3078F DIAST BP <80 MM HG: CPT | Performed by: NURSE PRACTITIONER

## 2025-04-23 RX ORDER — TRIAMCINOLONE ACETONIDE 1 MG/G
CREAM TOPICAL 2 TIMES DAILY
Qty: 30 G | Refills: 0 | Status: SHIPPED | OUTPATIENT
Start: 2025-04-23

## 2025-04-23 NOTE — PROGRESS NOTES
"Subjective   Patient ID: Yeison Case \"Dung\" is a 65 y.o. male. They present today with a chief complaint of Rash (Left ankle X 3 days. TORO).    History of Present Illness  Patient presents with concern for rash on left ankle. States he had an ultrasound done for pre-op 3-days ago and right away felt a burning sensation on the ankle. Since then it's developed redness and itching. He's used otc hydrocortisone with temporary improvement. Endorses no other symptoms of concern.      Rash        Past Medical History  Allergies as of 04/23/2025 - Reviewed 04/23/2025   Allergen Reaction Noted    Dog dander Other 01/18/2024    House dust Other 01/18/2024    Sulfa (sulfonamide antibiotics) Rash and Other 01/18/2024       Prescriptions Prior to Admission[1]     Medical History[2]    Surgical History[3]     reports that he has never smoked. He has never used smokeless tobacco. He reports that he does not drink alcohol and does not use drugs.    Review of Systems  Review of Systems   Skin:  Positive for rash.                                  Objective    Vitals:    04/23/25 1715   BP: 129/79   Pulse: 92   Resp: 15   Temp: 36.7 °C (98 °F)   SpO2: 98%     No LMP for male patient.    Physical Exam  Vitals reviewed.   Constitutional:       Appearance: Normal appearance.   Cardiovascular:      Rate and Rhythm: Normal rate.   Pulmonary:      Effort: Pulmonary effort is normal.   Skin:     General: Skin is warm and dry.      Findings: Rash present.      Comments: Small raised erythematous papules along the right ankle. No vesicles, no generalized erythema, no warmth   Neurological:      Mental Status: He is alert.         Procedures    Point of Care Test & Imaging Results from this visit  No results found for this visit on 04/23/25.   Imaging  No results found.    Cardiology, Vascular, and Other Imaging  No other imaging results found for the past 2 days      Diagnostic study results (if any) were reviewed by Elisabeth Pina, " [facility]    [facility]   APRN-CNP.    Assessment/Plan   Allergies, medications, history, and pertinent labs/EKGs/Imaging reviewed by MADDI Crews.     Medical Decision Making  Continue supportive measures, and symptom management. Provided prescription for steroid cream. F/u if s/s increase or worsen.     At time of discharge patient was clinically well-appearing and HDS for outpatient management. The patient and/or family was educated regarding diagnosis, supportive care, OTC and Rx medications. The patient and/or family was given the opportunity to ask questions prior to discharge.  They verbalized understanding of my discussion of the plans for treatment, expected course, indications to return to  or seek further evaluation in ED, and the need for timely follow up as directed.   They were provided with a work/school excuse if requested.      Orders and Diagnoses  Diagnoses and all orders for this visit:  Irritant contact dermatitis due to other agents  -     triamcinolone (Kenalog) 0.1 % cream; Apply topically 2 times a day.      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

## 2025-05-26 DIAGNOSIS — I10 BENIGN ESSENTIAL HYPERTENSION: ICD-10-CM

## 2025-05-27 ENCOUNTER — APPOINTMENT (OUTPATIENT)
Dept: PRIMARY CARE | Facility: CLINIC | Age: 65
End: 2025-05-27
Payer: COMMERCIAL

## 2025-05-27 RX ORDER — AMLODIPINE BESYLATE 5 MG/1
5 TABLET ORAL DAILY
Qty: 90 TABLET | Refills: 0 | Status: SHIPPED | OUTPATIENT
Start: 2025-05-27

## 2025-08-06 DIAGNOSIS — M10.9 ACUTE GOUT INVOLVING TOE, UNSPECIFIED CAUSE, UNSPECIFIED LATERALITY: Primary | ICD-10-CM

## 2025-08-06 RX ORDER — COLCHICINE 0.6 MG/1
0.6 TABLET ORAL DAILY
Qty: 10 TABLET | Refills: 0 | Status: SHIPPED | OUTPATIENT
Start: 2025-08-06 | End: 2025-08-16

## 2025-08-27 DIAGNOSIS — I10 BENIGN ESSENTIAL HYPERTENSION: ICD-10-CM

## 2025-08-28 RX ORDER — AMLODIPINE BESYLATE 5 MG/1
5 TABLET ORAL DAILY
Qty: 90 TABLET | Refills: 0 | Status: SHIPPED | OUTPATIENT
Start: 2025-08-28